# Patient Record
Sex: FEMALE | Race: WHITE | NOT HISPANIC OR LATINO | Employment: OTHER | URBAN - METROPOLITAN AREA
[De-identification: names, ages, dates, MRNs, and addresses within clinical notes are randomized per-mention and may not be internally consistent; named-entity substitution may affect disease eponyms.]

---

## 2023-08-09 PROBLEM — M25.579 ANKLE PAIN: Status: ACTIVE | Noted: 2023-08-09

## 2023-08-09 PROBLEM — T14.8XXA BROKEN BONES: Status: ACTIVE | Noted: 2023-08-09

## 2023-08-09 PROBLEM — M25.473 ANKLE SWELLING: Status: ACTIVE | Noted: 2023-08-09

## 2023-08-11 ENCOUNTER — OFFICE VISIT (OUTPATIENT)
Dept: BARIATRICS/WEIGHT MGMT | Facility: CLINIC | Age: 63
End: 2023-08-11
Payer: COMMERCIAL

## 2023-08-11 VITALS
WEIGHT: 259 LBS | SYSTOLIC BLOOD PRESSURE: 165 MMHG | HEIGHT: 63 IN | DIASTOLIC BLOOD PRESSURE: 97 MMHG | HEART RATE: 75 BPM | TEMPERATURE: 97.3 F | BODY MASS INDEX: 45.89 KG/M2

## 2023-08-11 DIAGNOSIS — Z71.3 DIETARY COUNSELING: ICD-10-CM

## 2023-08-11 DIAGNOSIS — M25.50 MULTIPLE JOINT PAIN: ICD-10-CM

## 2023-08-11 DIAGNOSIS — E66.01 OBESITY, CLASS III, BMI 40-49.9 (MORBID OBESITY): Primary | ICD-10-CM

## 2023-08-11 DIAGNOSIS — R63.5 UNINTENDED WEIGHT GAIN: ICD-10-CM

## 2023-08-11 DIAGNOSIS — K21.9 GASTROESOPHAGEAL REFLUX DISEASE, UNSPECIFIED WHETHER ESOPHAGITIS PRESENT: ICD-10-CM

## 2023-08-11 DIAGNOSIS — Z98.84 HISTORY OF ROUX-EN-Y GASTRIC BYPASS: ICD-10-CM

## 2023-08-11 DIAGNOSIS — R13.19 OTHER DYSPHAGIA: ICD-10-CM

## 2023-08-11 RX ORDER — ERGOCALCIFEROL (VITAMIN D2) 10 MCG
400 TABLET ORAL DAILY
COMMUNITY

## 2023-08-11 RX ORDER — URSODIOL 250 MG/1
3 TABLET, FILM COATED ORAL 2 TIMES DAILY
COMMUNITY
Start: 2023-07-13

## 2023-08-11 RX ORDER — PNV NO.95/FERROUS FUM/FOLIC AC 28MG-0.8MG
325 TABLET ORAL
COMMUNITY

## 2023-08-11 RX ORDER — SENNOSIDES 8.6 MG
650 CAPSULE ORAL EVERY 8 HOURS PRN
COMMUNITY

## 2023-08-11 RX ORDER — LANOLIN ALCOHOL/MO/W.PET/CERES
1000 CREAM (GRAM) TOPICAL DAILY
COMMUNITY

## 2023-08-11 NOTE — PROGRESS NOTES
[facility] BARIATRIC SURGERY  [address] [facility] [address]  [facility] [address]  [phone]  [address] [facility] [address]  [facility] [id number] [phone]  Dept: [phone]  8/11/2023      Carolynn Watt.  [id number]  [id number]  1960  female      Chief Complaint   Patient presents with    Consult     New patient AUGUSTO / RNY 2001        Post-Op Bariatric Surgery:   Carolynn Watt is status post Open RNY Gastric Bypass procedure, performed on 2001 at [facility].    HPI:   Today's weight is 117 kg (259 lb) pounds, today's BMI is Body mass index is 46.47 kg/mý.. HIS@ greatest weight loss from surgery was 110 pounds. The patient reports an unwanted weight gain of 99 pounds.  [unfilled] denies fever, chills, chest pain, SOA, melena, hematochezia, hematemesis, dysuria, frequency, hematuria, jaundice.    62-year-old female status post open Carmelo-en-Y gastric bypass in [facility] who lives in [place] who is interested in possible Apollo over stitch procedure.  Patient went through her daily routine and states that she does have difficulty with certain solid foods and does regurgitate food fairly regularly.  No heartburn symptoms but is on omeprazole and states that she has stopped in past and did have symptoms.  She does take meloxicam which she did not understand that could not take NSAIDs.  Also with swallowing B12 and not doing sublingual or injections.  She states that she was doing injections until about a year and a half ago.      Diet and Exercise:   Diet history reviewed and discussed with the patient. Weight loss/gains to date discussed with the patient. She reports eating 2 meals per day, a typical portion size of 1 cup, eating 2 snacks per day, drinking 5 or more 8-oz. glasses of water per day, no carbonated beverage consumption and exercising regularly.     Supplements: Prenatal multivitamin, p.o. B12    Review of Systems    Constitutional:  Positive for fatigue.   Gastrointestinal:  Positive for constipation.   Musculoskeletal:  Positive for arthralgias and back pain.   All other systems reviewed and are negative.    Patient Active Problem List   Diagnosis    Ankle pain    Broken bones    Ankle swelling    Dysphagia    Gastroesophageal reflux disease    s/p Bypass    BMI 40-49.9    Unintended weight gain    Diet Couns    Multiple joint pain       No past medical history on file.    Past Surgical History:   Procedure Laterality Date    COLONOSCOPY      GALLBLADDER SURGERY  2001 2001    UMBILICAL HERNIA REPAIR         Allergies   Allergen Reactions    Lortab [Hydrocodone-Acetaminophen] Itching         Current Outpatient Medications:     ursodiol (ACTIGALL) 250 MG tablet, Take 3 tablets by mouth 2 (Two) Times a Day., Disp: , Rfl:     acetaminophen (TYLENOL) 650 MG 8 hr tablet, Take 1 tablet by mouth Every 8 (Eight) Hours As Needed., Disp: , Rfl:     ferrous sulfate 325 (65 Fe) MG tablet, Take 1 tablet by mouth Daily With Breakfast., Disp: , Rfl:     levothyroxine (SYNTHROID, LEVOTHROID) 125 MCG tablet, Take 1 tablet by mouth Daily., Disp: , Rfl:     meloxicam (MOBIC) 7.5 MG tablet, Take 1 tablet by mouth Daily., Disp: , Rfl:     omeprazole (priLOSEC) 20 MG capsule, Take 1 capsule by mouth Daily., Disp: , Rfl:     vitamin B-12 (CYANOCOBALAMIN) 1000 MCG tablet, Take 1 tablet by mouth Daily., Disp: , Rfl:     Vitamin D, Cholecalciferol, (CHOLECALCIFEROL) 10 MCG (400 UNIT) tablet, Take 1 tablet by mouth Daily., Disp: , Rfl:     Social History     Socioeconomic History    Marital status:    Tobacco Use    Smoking status: Never   Substance and Sexual Activity    Alcohol use: Yes     Alcohol/week: 8.0 standard drinks     Types: 2 Glasses of wine, 6 Drinks containing 0.5 oz of alcohol per week    Drug use: Never    Sexual activity: Defer       Family History   Problem Relation Age of Onset    Hypertension Mother     Stroke Mother      Heart attack Mother     Hypertension Father     Obesity Sister     Hypertension Maternal Grandmother        The following portions of the patient's history were reviewed and updated as appropriate: allergies, current medications, past family history, past medical history, past social history, past surgical history, and problem list.    Vitals:    08/11/23 0847   BP: 165/97   Pulse: 75   Temp: 97.3 øF (36.3 øC)       Physical Exam  Vitals reviewed.   HENT:      Head: Normocephalic and atraumatic.      Mouth/Throat:      Mouth: Mucous membranes are moist.      Pharynx: Oropharynx is clear.   Eyes:      General: No scleral icterus.     Extraocular Movements: Extraocular movements intact.      Conjunctiva/sclera: Conjunctivae normal.      Pupils: Pupils are equal, round, and reactive to light.   Neck:      Thyroid: No thyromegaly.   Cardiovascular:      Rate and Rhythm: Normal rate.   Pulmonary:      Effort: Pulmonary effort is normal. No respiratory distress.      Breath sounds: Normal breath sounds. No stridor. No wheezing or rhonchi.   Abdominal:      General: Bowel sounds are normal.      Palpations: Abdomen is soft.      Tenderness: There is no abdominal tenderness. There is no right CVA tenderness, left CVA tenderness, guarding or rebound.      Hernia: No hernia is present.   Musculoskeletal:         General: Normal range of motion.      Cervical back: Normal range of motion and neck supple.   Lymphadenopathy:      Cervical: No cervical adenopathy.   Skin:     General: Skin is warm and dry.      Findings: No erythema.   Neurological:      Mental Status: She is alert and oriented to person, place, and time.   Psychiatric:         Mood and Affect: Mood normal.         Behavior: Behavior normal.         Thought Content: Thought content normal.         Judgment: Judgment normal.         Assessment:   Carolynn Watt has severe obesity with multiple co-morbidities who would like to transfer her bariatric care to us and  participate in our bariatric program.      Encounter Diagnoses   Name Primary?    BMI 40-49.9 Yes    s/p Bypass     Gastroesophageal reflux disease, unspecified whether esophagitis present     Diet Couns     Unintended weight gain     Dysphagia     Multiple joint pain          Discussion/Summary/Plan:     62-year-old female status post open Carmelo-en-Y gastric bypass in [facility] who was 1 teen possible revision of her gastric pouch and stoma with the Apollo over stitch procedure.  After further discussion with her where she is having difficulty with solid foods that this would not be a good recommendation unless there was something else involving her esophagus that is causing her symptoms.  We had a very long discussion regarding the gastric bypass and all of the do's and don'ts.  I recommend that she stop all NSAIDs and also bariatric vitamin regimen per gastric bypass which she will do.  We also discussed doing the meal replacement shakes to reset her tool which she will do.  Will proceed with upper endoscopy for further evaluation of possible Schatzki's ring or gastrojejunostomy stricture as a cause of her dysphagia.  The risks and benefits of the procedure were discussed with the patient in detail and all questions were answered.  Possibility of perforation, bleeding, aspiration, anoxic brain injury, respiratory and/or cardiac arrest and death were discussed.  Consent will be signed and witnessed.    Recommended patient be sure to eat at least three meals per day all with high lean protein, vegetables and fruit. Be sure to limit/cut back on daily simple carbohydrate intake. Discussed with the patient the recommended amount of water per day to intake. Reviewed vitamin requirements. Be sure to do routine exercise including both cardio and strength training. Recommended patient to see our dietician to go into more detail regarding diet changes.    Instructions / Recommendations: dietary counseling  recommended, recommended a daily protein intake of  grams, vitamin supplements recommended, recommended exercising at least 150 minutes per week, behavior modifications recommended and instructed to call the office for concerns, questions, or problems.    The patient was instructed to follow up in after endoscopy.     The patient was counseled regarding diet, exercise and the surgical procedures available. Dietician appointment was recommended as well.  Total time of encounter was over 45 minutes counseling the patient and going over the procedure.  Dietary changes as well as exercise were also discussed.  Time was also spent before the encounter to review their history and any documentation provided..

## 2023-08-11 NOTE — PATIENT INSTRUCTIONS
Diet Manual    You were given a manual that discusses in detail good eating tips and habits. Please read and keep with you for future. Metamucil one Tablespoon in 8 oz of water then follow with another 8 oz of water in the morning or evening

## 2023-08-11 NOTE — H&P (VIEW-ONLY)
[facility] BARIATRIC SURGERY  [address] [facility] [address]  [facility] [address]  [phone]  [address] [facility] [address]  [facility] [address]  [phone]  Dept: [phone]  8/11/2023      Carolynn Watt.  [id number]  [id number]  1960  female      Chief Complaint   Patient presents with    Consult     New patient AUGUSTO / RNY 2001        Post-Op Bariatric Surgery:   Carolynn Watt is status post Open RNY Gastric Bypass procedure, performed on 2001 at [facility].    HPI:   Today's weight is 117 kg (259 lb) pounds, today's BMI is Body mass index is 46.47 kg/m².. HIS@ greatest weight loss from surgery was 110 pounds. The patient reports an unwanted weight gain of 99 pounds.  [unfilled] denies fever, chills, chest pain, SOA, melena, hematochezia, hematemesis, dysuria, frequency, hematuria, jaundice.    62-year-old female status post open Carmelo-en-Y gastric bypass in [facility] who lives in [place] who is interested in possible Apollo over stitch procedure.  Patient went through her daily routine and states that she does have difficulty with certain solid foods and does regurgitate food fairly regularly.  No heartburn symptoms but is on omeprazole and states that she has stopped in past and did have symptoms.  She does take meloxicam which she did not understand that could not take NSAIDs.  Also with swallowing B12 and not doing sublingual or injections.  She states that she was doing injections until about a year and a half ago.      Diet and Exercise:   Diet history reviewed and discussed with the patient. Weight loss/gains to date discussed with the patient. She reports eating 2 meals per day, a typical portion size of 1 cup, eating 2 snacks per day, drinking 5 or more 8-oz. glasses of water per day, no carbonated beverage consumption and exercising regularly.     Supplements: Prenatal multivitamin, p.o. B12    Review of Systems    Constitutional:  Positive for fatigue.   Gastrointestinal:  Positive for constipation.   Musculoskeletal:  Positive for arthralgias and back pain.   All other systems reviewed and are negative.    Patient Active Problem List   Diagnosis    Ankle pain    Broken bones    Ankle swelling    Dysphagia    Gastroesophageal reflux disease    s/p Bypass    BMI 40-49.9    Unintended weight gain    Diet Couns    Multiple joint pain       No past medical history on file.    Past Surgical History:   Procedure Laterality Date    COLONOSCOPY      GALLBLADDER SURGERY  2001 2001    UMBILICAL HERNIA REPAIR         Allergies   Allergen Reactions    Lortab [Hydrocodone-Acetaminophen] Itching         Current Outpatient Medications:     ursodiol (ACTIGALL) 250 MG tablet, Take 3 tablets by mouth 2 (Two) Times a Day., Disp: , Rfl:     acetaminophen (TYLENOL) 650 MG 8 hr tablet, Take 1 tablet by mouth Every 8 (Eight) Hours As Needed., Disp: , Rfl:     ferrous sulfate 325 (65 Fe) MG tablet, Take 1 tablet by mouth Daily With Breakfast., Disp: , Rfl:     levothyroxine (SYNTHROID, LEVOTHROID) 125 MCG tablet, Take 1 tablet by mouth Daily., Disp: , Rfl:     meloxicam (MOBIC) 7.5 MG tablet, Take 1 tablet by mouth Daily., Disp: , Rfl:     omeprazole (priLOSEC) 20 MG capsule, Take 1 capsule by mouth Daily., Disp: , Rfl:     vitamin B-12 (CYANOCOBALAMIN) 1000 MCG tablet, Take 1 tablet by mouth Daily., Disp: , Rfl:     Vitamin D, Cholecalciferol, (CHOLECALCIFEROL) 10 MCG (400 UNIT) tablet, Take 1 tablet by mouth Daily., Disp: , Rfl:     Social History     Socioeconomic History    Marital status:    Tobacco Use    Smoking status: Never   Substance and Sexual Activity    Alcohol use: Yes     Alcohol/week: 8.0 standard drinks     Types: 2 Glasses of wine, 6 Drinks containing 0.5 oz of alcohol per week    Drug use: Never    Sexual activity: Defer       Family History   Problem Relation Age of Onset    Hypertension Mother     Stroke Mother      Heart attack Mother     Hypertension Father     Obesity Sister     Hypertension Maternal Grandmother        The following portions of the patient's history were reviewed and updated as appropriate: allergies, current medications, past family history, past medical history, past social history, past surgical history, and problem list.    Vitals:    08/11/23 0847   BP: 165/97   Pulse: 75   Temp: 97.3 °F (36.3 °C)       Physical Exam  Vitals reviewed.   HENT:      Head: Normocephalic and atraumatic.      Mouth/Throat:      Mouth: Mucous membranes are moist.      Pharynx: Oropharynx is clear.   Eyes:      General: No scleral icterus.     Extraocular Movements: Extraocular movements intact.      Conjunctiva/sclera: Conjunctivae normal.      Pupils: Pupils are equal, round, and reactive to light.   Neck:      Thyroid: No thyromegaly.   Cardiovascular:      Rate and Rhythm: Normal rate.   Pulmonary:      Effort: Pulmonary effort is normal. No respiratory distress.      Breath sounds: Normal breath sounds. No stridor. No wheezing or rhonchi.   Abdominal:      General: Bowel sounds are normal.      Palpations: Abdomen is soft.      Tenderness: There is no abdominal tenderness. There is no right CVA tenderness, left CVA tenderness, guarding or rebound.      Hernia: No hernia is present.   Musculoskeletal:         General: Normal range of motion.      Cervical back: Normal range of motion and neck supple.   Lymphadenopathy:      Cervical: No cervical adenopathy.   Skin:     General: Skin is warm and dry.      Findings: No erythema.   Neurological:      Mental Status: She is alert and oriented to person, place, and time.   Psychiatric:         Mood and Affect: Mood normal.         Behavior: Behavior normal.         Thought Content: Thought content normal.         Judgment: Judgment normal.         Assessment:   Carolynn Watt has severe obesity with multiple co-morbidities who would like to transfer her bariatric care to us and  participate in our bariatric program.      Encounter Diagnoses   Name Primary?    BMI 40-49.9 Yes    s/p Bypass     Gastroesophageal reflux disease, unspecified whether esophagitis present     Diet Couns     Unintended weight gain     Dysphagia     Multiple joint pain          Discussion/Summary/Plan:     62-year-old female status post open Carmelo-en-Y gastric bypass in [facility] who was 1 teen possible revision of her gastric pouch and stoma with the Apollo over stitch procedure.  After further discussion with her where she is having difficulty with solid foods that this would not be a good recommendation unless there was something else involving her esophagus that is causing her symptoms.  We had a very long discussion regarding the gastric bypass and all of the do's and don'ts.  I recommend that she stop all NSAIDs and also bariatric vitamin regimen per gastric bypass which she will do.  We also discussed doing the meal replacement shakes to reset her tool which she will do.  Will proceed with upper endoscopy for further evaluation of possible Schatzki's ring or gastrojejunostomy stricture as a cause of her dysphagia.  The risks and benefits of the procedure were discussed with the patient in detail and all questions were answered.  Possibility of perforation, bleeding, aspiration, anoxic brain injury, respiratory and/or cardiac arrest and death were discussed.  Consent will be signed and witnessed.    Recommended patient be sure to eat at least three meals per day all with high lean protein, vegetables and fruit. Be sure to limit/cut back on daily simple carbohydrate intake. Discussed with the patient the recommended amount of water per day to intake. Reviewed vitamin requirements. Be sure to do routine exercise including both cardio and strength training. Recommended patient to see our dietician to go into more detail regarding diet changes.    Instructions / Recommendations: dietary counseling  recommended, recommended a daily protein intake of  grams, vitamin supplements recommended, recommended exercising at least 150 minutes per week, behavior modifications recommended and instructed to call the office for concerns, questions, or problems.    The patient was instructed to follow up in after endoscopy.     The patient was counseled regarding diet, exercise and the surgical procedures available. Dietician appointment was recommended as well.  Total time of encounter was over 45 minutes counseling the patient and going over the procedure.  Dietary changes as well as exercise were also discussed.  Time was also spent before the encounter to review their history and any documentation provided..

## 2023-08-15 DIAGNOSIS — Z98.84 HISTORY OF ROUX-EN-Y GASTRIC BYPASS: ICD-10-CM

## 2023-08-15 DIAGNOSIS — E66.01 OBESITY, CLASS III, BMI 40-49.9 (MORBID OBESITY): Primary | ICD-10-CM

## 2023-08-15 DIAGNOSIS — R13.19 OTHER DYSPHAGIA: ICD-10-CM

## 2023-08-15 RX ORDER — SODIUM CHLORIDE, SODIUM LACTATE, POTASSIUM CHLORIDE, CALCIUM CHLORIDE 600; 310; 30; 20 MG/100ML; MG/100ML; MG/100ML; MG/100ML
30 INJECTION, SOLUTION INTRAVENOUS CONTINUOUS
OUTPATIENT
Start: 2023-08-15

## 2023-08-24 DIAGNOSIS — R13.19 OTHER DYSPHAGIA: ICD-10-CM

## 2023-08-24 DIAGNOSIS — Z98.84 HISTORY OF ROUX-EN-Y GASTRIC BYPASS: ICD-10-CM

## 2023-08-24 DIAGNOSIS — E66.01 OBESITY, CLASS III, BMI 40-49.9 (MORBID OBESITY): Primary | ICD-10-CM

## 2023-08-24 DIAGNOSIS — K21.9 GASTROESOPHAGEAL REFLUX DISEASE, UNSPECIFIED WHETHER ESOPHAGITIS PRESENT: ICD-10-CM

## 2023-08-24 RX ORDER — SODIUM CHLORIDE, SODIUM LACTATE, POTASSIUM CHLORIDE, CALCIUM CHLORIDE 600; 310; 30; 20 MG/100ML; MG/100ML; MG/100ML; MG/100ML
30 INJECTION, SOLUTION INTRAVENOUS CONTINUOUS
OUTPATIENT
Start: 2023-08-30

## 2023-08-29 ENCOUNTER — TELEPHONE (OUTPATIENT)
Dept: BARIATRICS/WEIGHT MGMT | Facility: CLINIC | Age: 63
End: 2023-08-29
Payer: COMMERCIAL

## 2023-08-29 RX ORDER — PREDNISOLONE ACETATE 10 MG/ML
1 SUSPENSION/ DROPS OPHTHALMIC 4 TIMES DAILY
COMMUNITY

## 2023-08-29 NOTE — TELEPHONE ENCOUNTER
Pt called and said she just had eye surgery yesterday and has a scope scheduled for tomorrow and was wondering if it was ok to proceed with the scope

## 2023-08-30 ENCOUNTER — HOSPITAL ENCOUNTER (OUTPATIENT)
Facility: HOSPITAL | Age: 63
Setting detail: HOSPITAL OUTPATIENT SURGERY
Discharge: HOME | End: 2023-08-30
Attending: SURGERY | Admitting: SURGERY
Payer: COMMERCIAL

## 2023-08-30 ENCOUNTER — ANESTHESIA EVENT (OUTPATIENT)
Dept: GASTROENTEROLOGY | Facility: HOSPITAL | Age: 63
End: 2023-08-30
Payer: COMMERCIAL

## 2023-08-30 ENCOUNTER — ANESTHESIA (OUTPATIENT)
Dept: GASTROENTEROLOGY | Facility: HOSPITAL | Age: 63
End: 2023-08-30
Payer: COMMERCIAL

## 2023-08-30 VITALS
RESPIRATION RATE: 15 BRPM | SYSTOLIC BLOOD PRESSURE: 144 MMHG | WEIGHT: 248.8 LBS | HEART RATE: 66 BPM | HEIGHT: 63 IN | DIASTOLIC BLOOD PRESSURE: 88 MMHG | OXYGEN SATURATION: 100 % | BODY MASS INDEX: 44.08 KG/M2

## 2023-08-30 DIAGNOSIS — Z98.84 HISTORY OF ROUX-EN-Y GASTRIC BYPASS: ICD-10-CM

## 2023-08-30 DIAGNOSIS — K21.9 GASTROESOPHAGEAL REFLUX DISEASE, UNSPECIFIED WHETHER ESOPHAGITIS PRESENT: ICD-10-CM

## 2023-08-30 DIAGNOSIS — R13.14 DYSPHAGIA, PHARYNGOESOPHAGEAL: Primary | ICD-10-CM

## 2023-08-30 DIAGNOSIS — R13.19 OTHER DYSPHAGIA: ICD-10-CM

## 2023-08-30 DIAGNOSIS — E66.01 OBESITY, CLASS III, BMI 40-49.9 (MORBID OBESITY): ICD-10-CM

## 2023-08-30 PROCEDURE — 88305 TISSUE EXAM BY PATHOLOGIST: CPT | Performed by: SURGERY

## 2023-08-30 PROCEDURE — 43239 EGD BIOPSY SINGLE/MULTIPLE: CPT | Performed by: SURGERY

## 2023-08-30 PROCEDURE — 25010000002 PROPOFOL 10 MG/ML EMULSION: Performed by: STUDENT IN AN ORGANIZED HEALTH CARE EDUCATION/TRAINING PROGRAM

## 2023-08-30 RX ORDER — PROPOFOL 10 MG/ML
VIAL (ML) INTRAVENOUS AS NEEDED
Status: DISCONTINUED | OUTPATIENT
Start: 2023-08-30 | End: 2023-08-30 | Stop reason: SURG

## 2023-08-30 RX ORDER — SODIUM CHLORIDE, SODIUM LACTATE, POTASSIUM CHLORIDE, CALCIUM CHLORIDE 600; 310; 30; 20 MG/100ML; MG/100ML; MG/100ML; MG/100ML
30 INJECTION, SOLUTION INTRAVENOUS CONTINUOUS
Status: DISCONTINUED | OUTPATIENT
Start: 2023-08-30 | End: 2023-08-30 | Stop reason: HOSPADM

## 2023-08-30 RX ORDER — LIDOCAINE HYDROCHLORIDE 20 MG/ML
INJECTION, SOLUTION INFILTRATION; PERINEURAL AS NEEDED
Status: DISCONTINUED | OUTPATIENT
Start: 2023-08-30 | End: 2023-08-30 | Stop reason: SURG

## 2023-08-30 RX ADMIN — PROPOFOL 50 MG: 10 INJECTION, EMULSION INTRAVENOUS at 12:45

## 2023-08-30 RX ADMIN — LIDOCAINE HYDROCHLORIDE 60 MG: 20 INJECTION, SOLUTION INFILTRATION; PERINEURAL at 12:39

## 2023-08-30 RX ADMIN — PROPOFOL 20 MG: 10 INJECTION, EMULSION INTRAVENOUS at 12:47

## 2023-08-30 RX ADMIN — PROPOFOL 50 MG: 10 INJECTION, EMULSION INTRAVENOUS at 12:42

## 2023-08-30 RX ADMIN — SODIUM CHLORIDE, POTASSIUM CHLORIDE, SODIUM LACTATE AND CALCIUM CHLORIDE 30 ML/HR: 600; 310; 30; 20 INJECTION, SOLUTION INTRAVENOUS at 11:58

## 2023-08-30 RX ADMIN — PROPOFOL 20 MG: 10 INJECTION, EMULSION INTRAVENOUS at 12:40

## 2023-08-30 RX ADMIN — PROPOFOL 50 MG: 10 INJECTION, EMULSION INTRAVENOUS at 12:39

## 2023-08-30 NOTE — ANESTHESIA PREPROCEDURE EVALUATION
Anesthesia Evaluation     Patient summary reviewed and Nursing notes reviewed   no history of anesthetic complications:   NPO Solid Status: > 2 hours  NPO Liquid Status: > 8 hours           Airway   Mallampati: II  TM distance: <3 FB  Neck ROM: full  Possible difficult intubation  Dental - normal exam     Pulmonary    (-) COPD, asthma  Cardiovascular     Rhythm: regular    (-) past MI, angina, cardiac stents      Neuro/Psych  (-) seizures, CVA  GI/Hepatic/Renal/Endo    (+) morbid obesity, GERD, liver disease (PBC), thyroid problem   (-) no renal disease    ROS Comment: S/p bypass    Musculoskeletal     Abdominal    Substance History - negative use     OB/GYN negative ob/gyn ROS         Other - negative ROS                       Anesthesia Plan    ASA 3     MAC     (I have reviewed the patient's history and chart with the patient, including all pertinent laboratory results and imaging. I have explained the risks of monitored anesthesia care including but not limited to respiratory depression, possible need for airway intervention, or possible intra-op recall. )  intravenous induction     Anesthetic plan, risks, benefits, and alternatives have been provided, discussed and informed consent has been obtained with: patient.  Pre-procedure education provided    CODE STATUS:

## 2023-08-30 NOTE — ANESTHESIA POSTPROCEDURE EVALUATION
Patient: Carolynn Watt    Procedure Summary       Date: 08/30/23 Room / Location:  [facility] /  [facility]    Anesthesia Start: 1235 Anesthesia Stop: 1253    Procedure: ESOPHAGOGASTRODUODENOSCOPY WITH COLD BIOPSIES (Esophagus) Diagnosis:       Obesity, Class III, BMI 40-49.9 (morbid obesity)      History of Carmelo-en-Y gastric bypass      Other dysphagia      Gastroesophageal reflux disease, unspecified whether esophagitis present      (Obesity, Class III, BMI 40-49.9 (morbid obesity) [E66.01])      (History of Carmelo-en-Y gastric bypass [Z98.84])      (Other dysphagia [R13.19])      (Gastroesophageal reflux disease, unspecified whether esophagitis present [K21.9])    Surgeons: Artemio Fiore IV, MD Provider: Gayle Panda MD    Anesthesia Type: MAC ASA Status: 3            Anesthesia Type: MAC    Vitals  Vitals Value Taken Time   /88 08/30/23 1311   Temp     Pulse 66 08/30/23 1311   Resp 15 08/30/23 1311   SpO2 100 % 08/30/23 1311           Post Anesthesia Care and Evaluation    Patient location during evaluation: PHASE II  Level of consciousness: awake  Pain management: adequate    Airway patency: patent  Anesthetic complications: No anesthetic complications    Cardiovascular status: acceptable  Respiratory status: acceptable  Hydration status: acceptable

## 2023-08-30 NOTE — OP NOTE
Surgeon: Artemio Fiore M.D.    Preoperative Diagnosis: Dysphagia status post open Carmelo-en-Y gastric bypass surgery    Postoperative Diagnosis: Hiatal hernia    Procedure Performed: EGD with biopsy    Indications: 62-year-old female presents for EGD--patient previously had open Carmelo-en-Y gastric bypass surgery more than 15 years ago in [place]--originally had presented for potential Apollo over stitch, but after talking with the patient it was determined that she was having dysphagia to solids and breads--EGD was scheduled for her to further evaluate    Procedure:     The procedure, indications, preparation and potential complications were explained to the patient, who indicated understanding and signed the corresponding consent forms.  The patient was identified, taken to the endoscopy suite, and placed on the left side down decubitus position.  The patient underwent a MAC anesthesia and was appropriately monitored through the case by the anesthesia personnel using continuous pulse oximetry, blood pressure and cardiac monitoring.  A bite block was placed.  After adequate IV sedation and using a transoral technique a lubed flexible endoscope was placed in the hypopharynx and advanced to the gastric pouch without difficulty.  The pouch was inspected and discussed below.  The scope was advanced through the stoma into the Carmelo limb.  In the Carmelo limb there was no bile staining or bowel dilatation and no abnormalities seen unless listed below.  Scope was then withdrawn back into the gastric pouch and cold forcep biopsies were obtained to rule out Helicobacter pylori if dictated below.  No active bleeding was noted.  The scope was then withdrawn back into the esophagus after decompressing the gastric pouch.  The Z line was noted and GE junction measured from the incisors.  The scope was then completely withdrawn.  The patient tolerated the procedure well and left the endoscopy suite in stable condition.  The  findings are listed below.    Stoma: Normal, no narrowing  Pouch: Normal, no significant ulceration or gastritis  Esophagus: Z-line marked at 32 cm    Recommendations:     We will schedule patient for upper GI to further assess degree of hiatal hernia and whether this is causing her dysphagia--patient will follow-up after this is completed

## 2023-08-31 LAB
LAB AP CASE REPORT: NORMAL
PATH REPORT.FINAL DX SPEC: NORMAL
PATH REPORT.GROSS SPEC: NORMAL

## 2023-09-22 ENCOUNTER — APPOINTMENT (OUTPATIENT)
Dept: OTHER | Facility: HOSPITAL | Age: 63
End: 2023-09-22
Payer: COMMERCIAL

## 2023-09-22 ENCOUNTER — HOSPITAL ENCOUNTER (OUTPATIENT)
Dept: GENERAL RADIOLOGY | Facility: HOSPITAL | Age: 63
Discharge: HOME | End: 2023-09-22
Payer: COMMERCIAL

## 2023-09-22 DIAGNOSIS — R13.14 DYSPHAGIA, PHARYNGOESOPHAGEAL: ICD-10-CM

## 2023-09-22 PROCEDURE — 74240 X-RAY XM UPR GI TRC 1CNTRST: CPT

## 2023-09-22 PROCEDURE — 0 DIATRIZOATE MEGLUMINE & SODIUM PER 1 ML: Performed by: SURGERY

## 2023-09-22 RX ADMIN — BARIUM SULFATE 700 MG: 700 TABLET ORAL at 08:20

## 2023-09-22 RX ADMIN — DIATRIZOATE MEGLUMINE AND DIATRIZOATE SODIUM 120 ML: 660; 100 LIQUID ORAL; RECTAL at 08:19

## 2023-09-27 ENCOUNTER — TELEPHONE (OUTPATIENT)
Dept: BARIATRICS/WEIGHT MGMT | Facility: CLINIC | Age: 63
End: 2023-09-27
Payer: COMMERCIAL

## 2023-09-27 NOTE — TELEPHONE ENCOUNTER
Reviewed patient's upper GI report and images--patient with good flow of contrast through the GJ junction--moderate size hiatal hernia--patient was not able to pass tablet--patient overall is asymptomatic, does not have dysphagia and is not having any reflux symptoms--Long discussion with her in terms of the utility of fixing her hiatal hernia, at this point with her asymptomatic I think the risks are higher than any potential benefit--instructed patient to monitor, if symptoms of dysphagia and/or severe reflux develop that would certainly change our plans--patient has a gastroenterologist in [place] and we will follow-up with him, if any issues arise she will make an appointment to see us.

## (undated) DEVICE — ADAPT CLN BIOGUARD AIR/H2O DISP

## (undated) DEVICE — KT ORCA ORCAPOD DISP STRL

## (undated) DEVICE — MSK ENDO PORT O2 POM ELITE CURAPLEX A/

## (undated) DEVICE — LN SMPL CO2 SHTRM SD STREAM W/M LUER

## (undated) DEVICE — SENSR O2 OXIMAX FNGR A/ 18IN NONSTR

## (undated) DEVICE — BLCK/BITE BLOX W/DENTL/RIM W/STRAP 54F

## (undated) DEVICE — CANN O2 ETCO2 FITS ALL CONN CO2 SMPL A/ 7IN DISP LF

## (undated) DEVICE — FRCP BX RADJAW4 NDL 2.8 240CM LG OG BX40

## (undated) DEVICE — TUBING, SUCTION, 1/4" X 10', STRAIGHT: Brand: MEDLINE